# Patient Record
Sex: MALE | Race: WHITE | URBAN - METROPOLITAN AREA
[De-identification: names, ages, dates, MRNs, and addresses within clinical notes are randomized per-mention and may not be internally consistent; named-entity substitution may affect disease eponyms.]

---

## 2023-03-03 ENCOUNTER — OFFICE VISIT (OUTPATIENT)
Dept: PRIMARY CARE CLINIC | Age: 63
End: 2023-03-03
Payer: COMMERCIAL

## 2023-03-03 VITALS
BODY MASS INDEX: 27.59 KG/M2 | OXYGEN SATURATION: 97 % | WEIGHT: 215 LBS | DIASTOLIC BLOOD PRESSURE: 77 MMHG | SYSTOLIC BLOOD PRESSURE: 169 MMHG | HEIGHT: 74 IN | HEART RATE: 65 BPM

## 2023-03-03 DIAGNOSIS — R03.0 ELEVATED BLOOD PRESSURE READING: ICD-10-CM

## 2023-03-03 DIAGNOSIS — J40 BRONCHITIS: Primary | ICD-10-CM

## 2023-03-03 DIAGNOSIS — R05.9 COUGH IN ADULT: ICD-10-CM

## 2023-03-03 PROCEDURE — 99203 OFFICE O/P NEW LOW 30 MIN: CPT | Performed by: NURSE PRACTITIONER

## 2023-03-03 RX ORDER — DOXYCYCLINE HYCLATE 100 MG/1
100 CAPSULE ORAL 2 TIMES DAILY
Qty: 20 CAPSULE | Refills: 0 | Status: SHIPPED | OUTPATIENT
Start: 2023-03-03 | End: 2023-03-13

## 2023-03-03 RX ORDER — OMEGA-3S/DHA/EPA/FISH OIL/D3 300MG-1000
CAPSULE ORAL
COMMUNITY

## 2023-03-03 RX ORDER — ZINC GLUCONATE 50 MG
50 TABLET ORAL DAILY
COMMUNITY

## 2023-03-03 RX ORDER — VALSARTAN AND HYDROCHLOROTHIAZIDE 160; 12.5 MG/1; MG/1
TABLET, FILM COATED ORAL
COMMUNITY

## 2023-03-03 RX ORDER — TESTOSTERONE 16.2 MG/G
40.5 GEL TRANSDERMAL DAILY
COMMUNITY
Start: 2022-08-18

## 2023-03-03 RX ORDER — EZETIMIBE 10 MG/1
TABLET ORAL
COMMUNITY
Start: 2022-02-25

## 2023-03-03 RX ORDER — HYDROCHLOROTHIAZIDE 12.5 MG/1
12.5 CAPSULE, GELATIN COATED ORAL DAILY
COMMUNITY
Start: 2022-05-26

## 2023-03-03 RX ORDER — IRBESARTAN 300 MG/1
TABLET ORAL
COMMUNITY
Start: 2017-01-01

## 2023-03-03 RX ORDER — ASPIRIN 81 MG/81MG
CAPSULE ORAL
COMMUNITY

## 2023-03-03 RX ORDER — CLOPIDOGREL BISULFATE 75 MG/1
TABLET ORAL
COMMUNITY
Start: 2021-03-01

## 2023-03-03 RX ORDER — PREDNISONE 20 MG/1
40 TABLET ORAL DAILY
Qty: 6 TABLET | Refills: 0 | Status: SHIPPED | OUTPATIENT
Start: 2023-03-03 | End: 2023-03-06

## 2023-03-03 RX ORDER — UBIDECARENONE 100 MG
100 CAPSULE ORAL 2 TIMES DAILY
COMMUNITY

## 2023-03-03 RX ORDER — IRBESARTAN 300 MG/1
TABLET ORAL
COMMUNITY
Start: 2023-01-16

## 2023-03-03 RX ORDER — ROSUVASTATIN CALCIUM 5 MG/1
TABLET, COATED ORAL
COMMUNITY
Start: 2023-01-09

## 2023-03-03 ASSESSMENT — PATIENT HEALTH QUESTIONNAIRE - PHQ9
2. FEELING DOWN, DEPRESSED OR HOPELESS: 0
SUM OF ALL RESPONSES TO PHQ QUESTIONS 1-9: 0
SUM OF ALL RESPONSES TO PHQ9 QUESTIONS 1 & 2: 0
1. LITTLE INTEREST OR PLEASURE IN DOING THINGS: 0
SUM OF ALL RESPONSES TO PHQ QUESTIONS 1-9: 0

## 2023-03-03 ASSESSMENT — ENCOUNTER SYMPTOMS
EYE REDNESS: 0
VOICE CHANGE: 0
SHORTNESS OF BREATH: 1
CHEST TIGHTNESS: 0
EYE DISCHARGE: 0
WHEEZING: 0
SORE THROAT: 0
COUGH: 1
SINUS PRESSURE: 0

## 2023-03-03 NOTE — PROGRESS NOTES
[address] [facility]  [address] [place] [address] [address] [address]  Dept: [phone]  Dept Fax: [phone]     Deepa Romero is a 60 y.o. male who presents to the urgent care today for his medicalconditions/complaints as noted below. Deepa Romero is c/o of Cough (Cough really hard fatigue is taking mucinex)    HPI:      Cough  This is a new problem. Associated symptoms include postnasal drip and shortness of breath (slightly worsening). Pertinent negatives include no chest pain, chills, ear pain, eye redness, fever, headaches, myalgias, rash, sore throat or wheezing. Treatments tried: mucinex. The treatment provided no relief. History reviewed. No pertinent past medical history. Current Outpatient Medications   Medication Sig Dispense Refill    Aspirin (VAZALORE) 81 MG CAPS aspirin 81 mg capsule   Take by oral route. vitamin D3 (CHOLECALCIFEROL) 10 MCG (400 UNIT) TABS tablet Take by mouth      coenzyme Q10 100 MG CAPS capsule Take 100 mg by mouth 2 times daily      clopidogrel (PLAVIX) 75 MG tablet TAKE ONE TABLET BY MOUTH DAILY      ezetimibe (ZETIA) 10 MG tablet TAKE ONE TABLET BY MOUTH DAILY      hydroCHLOROthiazide (MICROZIDE) 12.5 MG capsule Take 12.5 mg by mouth daily      irbesartan (AVAPRO) 300 MG tablet TAKE 1 TABLET(300 MG) BY MOUTH IN THE MORNING      irbesartan (AVAPRO) 300 MG tablet       rosuvastatin (CRESTOR) 5 MG tablet       Testosterone (ANDROGEL) 20.25 MG/ACT (1.62%) GEL gel Place 40.5 mg onto the skin daily.       valsartan-hydroCHLOROthiazide (DIOVAN-HCT) 160-12.5 MG per tablet valsartan 160 mg-hydrochlorothiazide 12.5 mg tablet      zinc gluconate 50 MG tablet Take 50 mg by mouth daily      doxycycline hyclate (VIBRAMYCIN) 100 MG capsule Take 1 capsule by mouth 2 times daily for 10 days 20 capsule 0    predniSONE (DELTASONE) 20 MG tablet Take 2 tablets by mouth daily for 3 days 6 tablet 0     No current facility-administered medications for this visit. No Known Allergies    Reviewed PMH, SH, and FH with the patient and updated. Subjective:      Review of Systems   Constitutional:  Positive for fatigue. Negative for chills and fever. HENT:  Positive for congestion (improving) and postnasal drip. Negative for ear discharge, ear pain, sinus pressure, sneezing, sore throat and voice change. Eyes:  Negative for discharge and redness. Respiratory:  Positive for cough (productive at times, but mostly dry) and shortness of breath (slightly worsening). Negative for chest tightness and wheezing. Cardiovascular: Negative. Negative for chest pain. Musculoskeletal:  Negative for myalgias. Skin:  Negative for rash. Neurological:  Negative for dizziness, weakness, light-headedness and headaches. Hematological:  Negative for adenopathy. All other systems reviewed and are negative. Objective:      Physical Exam  Vitals and nursing note reviewed. Constitutional:       General: He is not in acute distress. Appearance: Normal appearance. He is well-developed. He is not ill-appearing, toxic-appearing or diaphoretic. HENT:      Head: Normocephalic. Right Ear: Tympanic membrane and external ear normal.      Left Ear: Tympanic membrane and external ear normal.      Nose: Nose normal.      Right Sinus: No maxillary sinus tenderness or frontal sinus tenderness. Left Sinus: No maxillary sinus tenderness or frontal sinus tenderness. Mouth/Throat:      Pharynx: Oropharyngeal exudate (PND) and posterior oropharyngeal erythema present. Eyes:      General:         Right eye: No discharge. Left eye: No discharge. Cardiovascular:      Rate and Rhythm: Normal rate and regular rhythm. Heart sounds: Normal heart sounds. No murmur heard. Pulmonary:      Effort: Pulmonary effort is normal. No respiratory distress. Breath sounds: Normal breath sounds. No wheezing or rales. Lymphadenopathy:      Cervical: No cervical adenopathy. Skin:     General: Skin is warm. Findings: No rash. Neurological:      Mental Status: He is alert. BP (!) 169/77   Pulse 65   Ht 6' 2\" (1.88 m)   Wt 215 lb (97.5 kg)   SpO2 97%   BMI 27.60 kg/m²     XR completed in the office and reviewed. Assessment:       Diagnosis Orders   1. Bronchitis  doxycycline hyclate (VIBRAMYCIN) 100 MG capsule    predniSONE (DELTASONE) 20 MG tablet      2. Elevated blood pressure reading        3. Cough in adult  XR CHEST (2 VW)        Plan:      Patient instructed to complete antibiotic prescription fully. Prednisone sent to the pharmacy to help enable symptom relief. Honey to coat the back of the throat, humidification, and elevation of HOB recommended at this time. May use Tylenol for fever/pain. Educational materials provided on AVS.  Follow up if symptoms do not improve/worsen. Discussed symptoms that will warrant urgent ED evaluation/treatment. Orders Placed This Encounter   Medications    doxycycline hyclate (VIBRAMYCIN) 100 MG capsule     Sig: Take 1 capsule by mouth 2 times daily for 10 days     Dispense:  20 capsule     Refill:  0    predniSONE (DELTASONE) 20 MG tablet     Sig: Take 2 tablets by mouth daily for 3 days     Dispense:  6 tablet     Refill:  0          Patient given educational materials - see patient instructions. Discussed use, benefit, and side effects of prescribed medications. All patientquestions answered. Pt voiced understanding.     Electronically signed by MAU Johnson CNP on 3/3/2023at 5:57 PM

## 2024-02-26 ENCOUNTER — HOSPITAL ENCOUNTER (OUTPATIENT)
Age: 64
Discharge: HOME | End: 2024-02-26
Payer: COMMERCIAL

## 2024-02-26 ENCOUNTER — OFFICE VISIT (OUTPATIENT)
Dept: PRIMARY CARE CLINIC | Age: 64
End: 2024-02-26
Payer: COMMERCIAL

## 2024-02-26 VITALS
OXYGEN SATURATION: 97 % | HEART RATE: 68 BPM | DIASTOLIC BLOOD PRESSURE: 77 MMHG | TEMPERATURE: 97.7 F | SYSTOLIC BLOOD PRESSURE: 135 MMHG

## 2024-02-26 DIAGNOSIS — R68.89 FLU-LIKE SYMPTOMS: ICD-10-CM

## 2024-02-26 DIAGNOSIS — Z20.822 SUSPECTED COVID-19 VIRUS INFECTION: ICD-10-CM

## 2024-02-26 DIAGNOSIS — R06.02 SHORTNESS OF BREATH: ICD-10-CM

## 2024-02-26 DIAGNOSIS — R06.02 SHORTNESS OF BREATH: Primary | ICD-10-CM

## 2024-02-26 LAB
D DIMER PPP FEU-MCNC: <0.27 UG/ML FEU (ref 0–0.59)
INFLUENZA A ANTIGEN, POC: NEGATIVE
INFLUENZA B ANTIGEN, POC: NEGATIVE
LOT EXPIRE DATE: NORMAL
LOT KIT NUMBER: NORMAL
SARS-COV-2, POC: NORMAL
VALID INTERNAL CONTROL: NORMAL
VENDOR AND KIT NAME POC: NORMAL

## 2024-02-26 PROCEDURE — 99213 OFFICE O/P EST LOW 20 MIN: CPT | Performed by: FAMILY MEDICINE

## 2024-02-26 PROCEDURE — 36415 COLL VENOUS BLD VENIPUNCTURE: CPT

## 2024-02-26 PROCEDURE — 87428 SARSCOV & INF VIR A&B AG IA: CPT | Performed by: FAMILY MEDICINE

## 2024-02-26 PROCEDURE — 85379 FIBRIN DEGRADATION QUANT: CPT

## 2024-02-26 RX ORDER — MILK THISTLE 500 MG
1 CAPSULE ORAL DAILY
COMMUNITY
Start: 2018-01-01

## 2024-02-26 RX ORDER — NITROGLYCERIN 0.4 MG/1
TABLET SUBLINGUAL
COMMUNITY
Start: 2016-03-07

## 2024-02-26 RX ORDER — PREDNISONE 20 MG/1
40 TABLET ORAL DAILY
Qty: 10 TABLET | Refills: 0 | Status: SHIPPED | OUTPATIENT
Start: 2024-02-26 | End: 2024-03-02

## 2024-02-26 RX ORDER — VITAMIN B COMPLEX
1 CAPSULE ORAL DAILY
COMMUNITY

## 2024-02-26 RX ORDER — SILDENAFIL CITRATE 20 MG/1
60 TABLET ORAL DAILY PRN
COMMUNITY

## 2024-02-26 ASSESSMENT — ENCOUNTER SYMPTOMS
SORE THROAT: 0
SINUS COMPLAINT: 1
SHORTNESS OF BREATH: 1
COUGH: 1
SINUS PRESSURE: 1

## 2024-02-26 NOTE — PATIENT INSTRUCTIONS
COVID and flu test in office negative  Will call with final xray read when available  Have d-dimer done to rule out blood clot  Take prednisone as prescribed to help with shortness of breath  Continue over the counter cough/cold medications as needed for symptoms  If symptoms worsen or do not improve please follow-up with PCP or return to clinic

## 2024-02-26 NOTE — PROGRESS NOTES
CHEST STANDARD (2 VW)     Standing Status:   Future     Number of Occurrences:   1     Standing Expiration Date:   2/26/2025     Order Specific Question:   Reason for exam:     Answer:   shortness of breath    D-Dimer, Quantitative     Standing Status:   Future     Standing Expiration Date:   2/26/2025    POCT COVID-19 & Influenza A/B     Order Specific Question:   Is this test for diagnosis or screening?     Answer:   Diagnosis of ill patient     Order Specific Question:   Symptomatic for COVID-19 as defined by CDC?     Answer:   Yes     Order Specific Question:   Date of Symptom Onset     Answer:   2/22/2024     Order Specific Question:   Hospitalized for COVID-19?     Answer:   No     Order Specific Question:   Admitted to ICU for COVID-19?     Answer:   No     Order Specific Question:   Employed in healthcare setting?     Answer:   Unknown     Order Specific Question:   Resident in a congregate (group) care setting?     Answer:   Unknown     Order Specific Question:   Previously tested for COVID-19?     Answer:   Unknown     Order Specific Question:   Pregnant:     Answer:   No     No orders of the defined types were placed in this encounter.     Patient given educational materials - see patient instructions.  Discussed use, benefit, and side effects of prescribed medications.  All patient questions answered.  Pt voiced understanding. Patient agreed with treatment plan. Follow up as directed.     Electronicallysigned by EMILY Fam MD on 2/26/2024 at 2:25 PM

## 2025-02-07 LAB — TESTOSTERONE TOTAL: 539 NG/DL

## 2025-05-07 LAB
CHOLESTEROL, TOTAL: 151 MG/DL
CHOLESTEROL/HDL RATIO: 5.6
HDLC SERPL-MCNC: 27 MG/DL
TRIGL SERPL-MCNC: 457 MG/DL
VLDLC SERPL CALC-MCNC: 91 MG/DL